# Patient Record
Sex: MALE | Race: WHITE | NOT HISPANIC OR LATINO
[De-identification: names, ages, dates, MRNs, and addresses within clinical notes are randomized per-mention and may not be internally consistent; named-entity substitution may affect disease eponyms.]

---

## 2022-02-17 PROBLEM — Z00.00 ENCOUNTER FOR PREVENTIVE HEALTH EXAMINATION: Status: ACTIVE | Noted: 2022-02-17

## 2022-02-22 ENCOUNTER — APPOINTMENT (OUTPATIENT)
Dept: SURGERY | Facility: CLINIC | Age: 32
End: 2022-02-22

## 2022-03-15 ENCOUNTER — APPOINTMENT (OUTPATIENT)
Dept: SURGERY | Facility: CLINIC | Age: 32
End: 2022-03-15
Payer: MEDICAID

## 2022-03-15 VITALS
RESPIRATION RATE: 18 BRPM | BODY MASS INDEX: 26.52 KG/M2 | HEIGHT: 66 IN | SYSTOLIC BLOOD PRESSURE: 138 MMHG | DIASTOLIC BLOOD PRESSURE: 90 MMHG | OXYGEN SATURATION: 100 % | WEIGHT: 165 LBS | HEART RATE: 97 BPM

## 2022-03-15 DIAGNOSIS — F19.90 OTHER PSYCHOACTIVE SUBSTANCE USE, UNSPECIFIED, UNCOMPLICATED: ICD-10-CM

## 2022-03-15 DIAGNOSIS — K60.2 ANAL FISSURE, UNSPECIFIED: ICD-10-CM

## 2022-03-15 DIAGNOSIS — Z87.898 PERSONAL HISTORY OF OTHER SPECIFIED CONDITIONS: ICD-10-CM

## 2022-03-15 DIAGNOSIS — Z72.0 TOBACCO USE: ICD-10-CM

## 2022-03-15 DIAGNOSIS — Z78.9 OTHER SPECIFIED HEALTH STATUS: ICD-10-CM

## 2022-03-15 PROCEDURE — 99202 OFFICE O/P NEW SF 15 MIN: CPT

## 2022-03-15 RX ORDER — DEXTROAMPHETAMINE SACCHARATE, AMPHETAMINE ASPARTATE, DEXTROAMPHETAMINE SULFATE, AND AMPHETAMINE SULFATE 7.5; 7.5; 7.5; 7.5 MG/1; MG/1; MG/1; MG/1
30 TABLET ORAL
Refills: 0 | Status: ACTIVE | COMMUNITY

## 2022-03-15 NOTE — ASSESSMENT
[FreeTextEntry1] : Mr. GIANG is a 32 year male who presents for painful anal fissure. \par \par Discussed management options for anal fissure including medical management with stool softeners, increased fluid intake, and topical compound cream with lidocaine and nifedipine. If medical management is not successful, surgical options include Botox injection for chemical denervation of the anal sphincter as well as lateral internal sphincterotomy. \par \par Given the findings today on exam and chronicity of symptoms recommend starting with medical management of anal fissure\par Recommend daily stool softeners such as Colace, Senna or Dulcolax, 6-8 glasses of noncaffeinated beverages daily and twice daily application of lidocaine/nifedipine compound. \par \par Should expect symptoms to improve over next two weeks. Initial medical treatment lasts 6-8 weeks, then will reassess for healing.  Given chronicity of his symptoms he may not benefit from a second round of medical treatment and rather proceed with surgery.\par \par Will return for follow up in 6-8 weeks to reassess. Discussed that if symptoms do not improve in first week or two should return sooner to readdress care and consider surgical options for quicker resolution.\par \par

## 2022-03-15 NOTE — HISTORY OF PRESENT ILLNESS
[FreeTextEntry1] : 32-year-old male here for initial evaluation for anal pain.  Patient believes symptoms started approximately a year and a half ago.  Was related to hemorrhoids.  Pain can be quite severe with bowel movements.  Occasional blood.  Denies prior anorectal surgery or treatment for hemorrhoids.  Has used preparation in the past with little effect.  Had a colonoscopy several years ago because he was having diarrhea which was nonrevealing.  Pain is worse with bowel movements.

## 2022-03-15 NOTE — PHYSICAL EXAM
[Abdomen Masses] : No abdominal masses [Abdomen Tenderness] : ~T No ~M abdominal tenderness [Posterior] : posteriorly [Excoriation] : no perianal excoriation [JVD] : no jugular venous distention  [Respiratory Effort] : normal respiratory effort [No Rash or Lesion] : No rash or lesion [Alert] : not alert [Calm] : calm [de-identified] : Posterior midline chronic anal fissure. [de-identified] : Skin bridge consistent with partial healing and chronicity. [de-identified] : No acute distress

## 2022-04-22 ENCOUNTER — RESULT REVIEW (OUTPATIENT)
Age: 32
End: 2022-04-22

## 2022-05-10 ENCOUNTER — APPOINTMENT (OUTPATIENT)
Dept: SURGERY | Facility: CLINIC | Age: 32
End: 2022-05-10

## 2022-05-26 ENCOUNTER — RESULT REVIEW (OUTPATIENT)
Age: 32
End: 2022-05-26

## 2022-05-27 ENCOUNTER — APPOINTMENT (OUTPATIENT)
Dept: SURGERY | Facility: HOSPITAL | Age: 32
End: 2022-05-27